# Patient Record
Sex: MALE | Race: WHITE
[De-identification: names, ages, dates, MRNs, and addresses within clinical notes are randomized per-mention and may not be internally consistent; named-entity substitution may affect disease eponyms.]

---

## 2018-01-11 ENCOUNTER — HOSPITAL ENCOUNTER (EMERGENCY)
Dept: HOSPITAL 17 - PHED | Age: 83
Discharge: HOME | End: 2018-01-11
Payer: MEDICARE

## 2018-01-11 VITALS
SYSTOLIC BLOOD PRESSURE: 126 MMHG | RESPIRATION RATE: 20 BRPM | HEART RATE: 65 BPM | DIASTOLIC BLOOD PRESSURE: 60 MMHG | OXYGEN SATURATION: 95 %

## 2018-01-11 VITALS — HEIGHT: 70 IN | WEIGHT: 159.84 LBS | BODY MASS INDEX: 22.88 KG/M2

## 2018-01-11 VITALS
TEMPERATURE: 97.8 F | OXYGEN SATURATION: 97 % | HEART RATE: 51 BPM | RESPIRATION RATE: 18 BRPM | SYSTOLIC BLOOD PRESSURE: 93 MMHG | DIASTOLIC BLOOD PRESSURE: 52 MMHG
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
## 2018-01-11 DIAGNOSIS — I25.10: ICD-10-CM
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
## 2018-01-11 DIAGNOSIS — I10: ICD-10-CM

## 2018-01-11 DIAGNOSIS — Z23: ICD-10-CM

## 2018-01-11 DIAGNOSIS — E78.00: ICD-10-CM

## 2018-01-11 DIAGNOSIS — K21.9: ICD-10-CM

## 2018-01-11 DIAGNOSIS — S61.512A: Primary | ICD-10-CM

## 2018-01-11 LAB
BASOPHILS # BLD AUTO: 0.1 TH/MM3 (ref 0–0.2)
BASOPHILS NFR BLD: 0.7 % (ref 0–2)
BUN SERPL-MCNC: 29 MG/DL (ref 7–18)
CALCIUM SERPL-MCNC: 8.4 MG/DL (ref 8.5–10.1)
CHLORIDE SERPL-SCNC: 102 MEQ/L (ref 98–107)
CREAT SERPL-MCNC: 1.7 MG/DL (ref 0.6–1.3)
EOSINOPHIL # BLD: 0.1 TH/MM3 (ref 0–0.4)
EOSINOPHIL NFR BLD: 0.6 % (ref 0–4)
ERYTHROCYTE [DISTWIDTH] IN BLOOD BY AUTOMATED COUNT: 13.5 % (ref 11.6–17.2)
GFR SERPLBLD BASED ON 1.73 SQ M-ARVRAT: 38 ML/MIN (ref 89–?)
GLUCOSE SERPL-MCNC: 120 MG/DL (ref 74–106)
HCO3 BLD-SCNC: 29.5 MEQ/L (ref 21–32)
HCT VFR BLD CALC: 33.5 % (ref 39–51)
HGB BLD-MCNC: 10.6 GM/DL (ref 13–17)
LYMPHOCYTES # BLD AUTO: 9.6 TH/MM3 (ref 1–4.8)
LYMPHOCYTES NFR BLD AUTO: 68.2 % (ref 9–44)
LYMPHOCYTES: 59 % (ref 9–44)
MCH RBC QN AUTO: 29.6 PG (ref 27–34)
MCHC RBC AUTO-ENTMCNC: 31.6 % (ref 32–36)
MCV RBC AUTO: 93.4 FL (ref 80–100)
MONOCYTE #: 0.4 TH/MM3 (ref 0–0.9)
MONOCYTES NFR BLD: 3 % (ref 0–8)
NEUTROPHILS # BLD AUTO: 3.9 TH/MM3 (ref 1.8–7.7)
NEUTROPHILS NFR BLD AUTO: 27.5 % (ref 16–70)
NEUTS BAND # BLD MANUAL: 5.6 TH/MM3 (ref 1.8–7.7)
NEUTS BAND NFR BLD: 1 % (ref 0–6)
NEUTS SEG NFR BLD MANUAL: 39 % (ref 16–70)
PLATELET # BLD: 115 TH/MM3 (ref 150–450)
PMV BLD AUTO: 9.1 FL (ref 7–11)
RBC # BLD AUTO: 3.59 MIL/MM3 (ref 4.5–5.9)
SODIUM SERPL-SCNC: 137 MEQ/L (ref 136–145)
WBC # BLD AUTO: 14.1 TH/MM3 (ref 4–11)

## 2018-01-11 PROCEDURE — 90714 TD VACC NO PRESV 7 YRS+ IM: CPT

## 2018-01-11 PROCEDURE — 90471 IMMUNIZATION ADMIN: CPT

## 2018-01-11 PROCEDURE — 99284 EMERGENCY DEPT VISIT MOD MDM: CPT

## 2018-01-11 PROCEDURE — 85027 COMPLETE CBC AUTOMATED: CPT

## 2018-01-11 PROCEDURE — 93005 ELECTROCARDIOGRAM TRACING: CPT

## 2018-01-11 PROCEDURE — 12001 RPR S/N/AX/GEN/TRNK 2.5CM/<: CPT

## 2018-01-11 PROCEDURE — 96360 HYDRATION IV INFUSION INIT: CPT

## 2018-01-11 PROCEDURE — 80048 BASIC METABOLIC PNL TOTAL CA: CPT

## 2018-01-11 PROCEDURE — 85007 BL SMEAR W/DIFF WBC COUNT: CPT

## 2018-01-11 PROCEDURE — 12034 INTMD RPR S/TR/EXT 7.6-12.5: CPT

## 2018-01-11 NOTE — PD
HPI


Chief Complaint:  fall


Time Seen by Provider:  10:53


Travel History


International Travel<30 days:  No


Contact w/Intl Traveler<30days:  No


Traveled to known affect area:  No





History of Present Illness


HPI


This 86-year-old male is brought for evaluation of injuries from a fall.  He 

says he was out for his daily walk.  He walks about 3 miles a day area he says 

he tripped on a root and landed on both of his hands.  He believes that the 

watch on his left wrist caused a laceration.  He denies any chest or abdominal 

pain.  It was reported that he had a syncopal episode while in our department 

was attending to his injury.  Patient does not think that he did have a 

syncopal episode.  He denies any chest pain or palpitations





PFS


Past Medical History


Cancer:  Yes (PROSTATE)


High Cholesterol:  Yes


Coronary Artery Disease:  Yes


GERD:  Yes


Hypertension:  Yes


Radiation Therapy:  Yes (PROSTATE)





Past Surgical History


Appendectomy:  Yes


Tonsillectomy:  Yes





Social History


Alcohol Use:  Yes (OCCASIONALLY)


Tobacco Use:  No


Substance Use:  No





Allergies-Medications


(Allergen,Severity, Reaction):  


Coded Allergies:  


     No Known Allergies (Verified  Adverse Reaction, Unknown, 1/11/18)


Reported Meds & Prescriptions





Reported Meds & Active Scripts


Active


Reported


Vitamin B-12 (Cyanocobalamin) 1,000 Mcg Tab 1,000 Mcg PO DAILY


Centrum (Multiple Vitamins W/ Minerals) 1 Chew 1 Tab CHEW DAILY


Aspirin EC (Aspirin) 81 Mg Tabdr 81 Mg PO DAILY


Nexium (Esomeprazole DR) 40 Mg Capdr 40 Mg PO DAILY


Pravastatin 40 Mg Tab 40 Mg PO DAILY


Benicar Hct (Olmesartan-Hydrochlorothiazide) 40-25 mg Tab 1 Tab PO DAILY


Glucosamine-Chondroitin 500-400 Mg Tab 1 Tab PO DAILY








Review of Systems


Except as stated in HPI:  all other systems reviewed are Neg


General / Constitutional:  No: Fever, Chills


Eyes:  No: Diploplia, Blurred Vision


HENT:  No: Headaches


Cardiovascular:  No: Chest Pain or Discomfort, Palpitations


Respiratory:  No: Cough, Shortness of Breath


Gastrointestinal:  No: Vomiting, Diarrhea


Genitourinary:  No: Urgency, Frequency


Skin:  No Rash


Endocrine:  No: Heat Intolerance, Cold Intolerance


Hematologic/Lymphatic:  No: Easy Bruising





Physical Exam


Narrative


GENERAL: Well-developed male


SKIN: Focused skin assessment warm/dry.


HEAD: Atraumatic. Normocephalic. 


EYES: Pupils equal and round. No scleral icterus. No injection or drainage. 


ENT: No nasal bleeding or discharge.  Mucous membranes pink and moist.


NECK: Trachea midline. No JVD. 


CARDIOVASCULAR: Regular rate and rhythm.  No murmur appreciated.


RESPIRATORY: No accessory muscle use. Clear to auscultation. Breath sounds 

equal bilaterally. 


GASTROINTESTINAL: Abdomen soft, non-tender, nondistended. Hepatic and splenic 

margins not palpable. 


MUSCULOSKELETAL: No obvious deformities. No clubbing.  No cyanosis.  No edema.  

He has a serration on the radial side of the left wrist.  Also scattered 

abrasions.   appear equal and does not appear to be any bony tenderness.


NEUROLOGICAL: Awake and alert. No obvious cranial nerve deficits.  Motor 

grossly within normal limits. Normal speech.


PSYCHIATRIC: Appropriate mood and affect; insight and judgment normal.





Data


Data


Last Documented VS





Vital Signs








  Date Time  Temp Pulse Resp B/P (MAP) Pulse Ox O2 Delivery O2 Flow Rate FiO2


 


1/11/18 12:49  65 20 126/60 (82) 95 Room Air  


 


1/11/18 10:45 97.8       








Orders





 Orders


Lidocai-Epi 0.5%-1:200,000 Inj (Xylocain (1/11/18 11:00)


Electrocardiogram (1/11/18 10:53)


Complete Blood Count With Diff (1/11/18 10:53)


Basic Metabolic Panel (Bmp) (1/11/18 10:53)


Sodium Chlorid 0.9% 500 Ml Inj (Ns 500 M (1/11/18 11:15)


Tetanus/Diphtheria Tox Adult (Tetanus/Di (1/11/18 12:45)


Ed Discharge Order (1/11/18 12:34)





Labs





Laboratory Tests








Test


  1/11/18


11:05


 


White Blood Count 14.1 TH/MM3 


 


Red Blood Count 3.59 MIL/MM3 


 


Hemoglobin 10.6 GM/DL 


 


Hematocrit 33.5 % 


 


Mean Corpuscular Volume 93.4 FL 


 


Mean Corpuscular Hemoglobin 29.6 PG 


 


Mean Corpuscular Hemoglobin


Concent 31.6 % 


 


 


Red Cell Distribution Width 13.5 % 


 


Platelet Count 115 TH/MM3 


 


Mean Platelet Volume 9.1 FL 


 


Neutrophils (%) (Auto) 27.5 % 


 


Lymphocytes (%) (Auto) 68.2 % 


 


Monocytes (%) (Auto) 3.0 % 


 


Eosinophils (%) (Auto) 0.6 % 


 


Basophils (%) (Auto) 0.7 % 


 


Neutrophils # (Auto) 3.9 TH/MM3 


 


Lymphocytes # (Auto) 9.6 TH/MM3 


 


Monocytes # (Auto) 0.4 TH/MM3 


 


Eosinophils # (Auto) 0.1 TH/MM3 


 


Basophils # (Auto) 0.1 TH/MM3 


 


CBC Comment AUTO DIFF 


 


Differential Total Cells


Counted 100 


 


 


Neutrophils % (Manual) 39 % 


 


Band Neutrophils % 1 % 


 


Lymphocytes % 59 % 


 


Eosinophils % 1 % 


 


Neutrophils # (Manual) 5.6 TH/MM3 


 


Differential Comment


  FINAL DIFF


MANUAL


 


Blood Urea Nitrogen 29 MG/DL 


 


Creatinine 1.70 MG/DL 


 


Random Glucose 120 MG/DL 


 


Calcium Level 8.4 MG/DL 


 


Sodium Level 137 MEQ/L 


 


Potassium Level 3.8 MEQ/L 


 


Chloride Level 102 MEQ/L 


 


Carbon Dioxide Level 29.5 MEQ/L 


 


Anion Gap 6 MEQ/L 


 


Estimat Glomerular Filtration


Rate 38 ML/MIN 


 











MDM


Medical Decision Making


Medical Screen Exam Complete:  Yes


Emergency Medical Condition:  Yes


Medical Record Reviewed:  Yes


Differential Diagnosis


Differential includes laceration left wrist.  Because of the questionable 

syncopal episode I did order some blood work which is unremarkable.  EKG shows 

sinus bradycardia which was present on an EKG of 2012.  He is stable for 

discharge


Narrative Course


Workup for syncope as negative.  Patient is stable for discharge.  His 

laceration has been sutured





Diagnosis





 Primary Impression:  


 Laceration of left wrist





***Additional Instructions:  


Suture removal 10 days


Disposition:  01 DISCHARGE HOME


Condition:  Stable











Jose Boswell MD Jan 11, 2018 11:01

## 2018-01-11 NOTE — PD
Physical Exam


Narrative


I was asked by Dr. Maradiaga to repair patient's lacerations of his left forearm.

  Please see his documentation for full H&P.





Data


Data


Last Documented VS





Vital Signs








  Date Time  Temp Pulse Resp B/P (MAP) Pulse Ox O2 Delivery O2 Flow Rate FiO2


 


1/11/18 10:56  51   97 Room Air  


 


1/11/18 10:45 97.8  18 93/52 (66)    








Orders





 Orders


Lidocai-Epi 0.5%-1:200,000 Inj (Xylocain (1/11/18 11:00)


Electrocardiogram (1/11/18 10:53)


Complete Blood Count With Diff (1/11/18 10:53)


Basic Metabolic Panel (Bmp) (1/11/18 10:53)


Sodium Chlorid 0.9% 500 Ml Inj (Ns 500 M (1/11/18 11:15)





Labs





Laboratory Tests








Test


  1/11/18


11:05


 


White Blood Count 14.1 TH/MM3 


 


Red Blood Count 3.59 MIL/MM3 


 


Hemoglobin 10.6 GM/DL 


 


Hematocrit 33.5 % 


 


Mean Corpuscular Volume 93.4 FL 


 


Mean Corpuscular Hemoglobin 29.6 PG 


 


Mean Corpuscular Hemoglobin


Concent 31.6 % 


 


 


Red Cell Distribution Width 13.5 % 


 


Platelet Count 115 TH/MM3 


 


Mean Platelet Volume 9.1 FL 


 


Neutrophils (%) (Auto) 27.5 % 


 


Lymphocytes (%) (Auto) 68.2 % 


 


Monocytes (%) (Auto) 3.0 % 


 


Eosinophils (%) (Auto) 0.6 % 


 


Basophils (%) (Auto) 0.7 % 


 


Neutrophils # (Auto) 3.9 TH/MM3 


 


Lymphocytes # (Auto) 9.6 TH/MM3 


 


Monocytes # (Auto) 0.4 TH/MM3 


 


Eosinophils # (Auto) 0.1 TH/MM3 


 


Basophils # (Auto) 0.1 TH/MM3 


 


CBC Comment AUTO DIFF 


 


Differential Total Cells


Counted 100 


 


 


Neutrophils % (Manual) 39 % 


 


Band Neutrophils % 1 % 


 


Lymphocytes % 59 % 


 


Eosinophils % 1 % 


 


Neutrophils # (Manual) 5.6 TH/MM3 


 


Differential Comment


  FINAL DIFF


MANUAL


 


Blood Urea Nitrogen 29 MG/DL 


 


Creatinine 1.70 MG/DL 


 


Random Glucose 120 MG/DL 


 


Calcium Level 8.4 MG/DL 


 


Sodium Level 137 MEQ/L 


 


Potassium Level 3.8 MEQ/L 


 


Chloride Level 102 MEQ/L 


 


Carbon Dioxide Level 29.5 MEQ/L 


 


Anion Gap 6 MEQ/L 


 


Estimat Glomerular Filtration


Rate 38 ML/MIN 


 











[facility]


Supervised Visit with LALO:  No


Procedures


**Procedure Narrative**


LACERATION REPAIR


LOCATION: Left forearm


LENGTH: Approximately 11 cm in total length slightly Y-shaped


NUMBER OF STITCHES/STAPLES: 1 simple interrupted subcutaneous and 11 simple 

interrupted to close.





REPAIR: Verbal consent was obtained.  The area of the laceration was cleaned 

and prepped.  The laceration was infiltrated with lidocaine with epi.  The 

wound was copiously irrigated and explored without evidence of foreign body, 

bony involvement, ligament injury, tendon injury, or neurovascular injury.  The 

wound was closed using 4-0 Vicryl and 4-0 Ethilon by medical student Shwetha. 

This was a 2 layer repair. A sterile dressing was applied. The patient was 

advised to keep the affected area as clean and dry as possible using soap and 

water.  There were no complications. Patient tolerated the procedure well.





LACERATION REPAIR


LOCATION: Left forearm


LENGTH: Approximately 1.5 cm in total length 


NUMBER OF STITCHES/STAPLES: 2 simple interrupted





REPAIR: Verbal consent was obtained.  The area of the laceration was cleaned 

and prepped.  The laceration was infiltrated with lidocaine with epi.  The 

wound was copiously irrigated and explored without evidence of foreign body, 

bony involvement, ligament injury, tendon injury, or neurovascular injury.  The 

wound was closed using 4-0 Ethilon by medical student Shwetha. This was a 1 

layer repair. A sterile dressing was applied. The patient was advised to keep 

the affected area as clean and dry as possible using soap and water.  There 

were no complications. Patient tolerated the procedure well.











Aubrey Kaiser Jan 11, 2018 12:24

## 2018-01-12 NOTE — EKG
Date Performed: 01/11/2018       Time Performed: 11:11:53

 

PTAGE:      86 years

 

EKG:      SINUS BRADYCARDIA BORDERLINE ECG

 

PREVIOUS TRACING       : 04/09/2012 11.41 Since previous tracing, no significant change noted

 

DOCTOR:   Danny Barahona  Interpretating Date/Time  01/12/2018 16:46:03